# Patient Record
Sex: FEMALE | Race: BLACK OR AFRICAN AMERICAN | NOT HISPANIC OR LATINO | URBAN - METROPOLITAN AREA
[De-identification: names, ages, dates, MRNs, and addresses within clinical notes are randomized per-mention and may not be internally consistent; named-entity substitution may affect disease eponyms.]

---

## 2017-01-22 ENCOUNTER — EMERGENCY (EMERGENCY)
Facility: HOSPITAL | Age: 22
LOS: 1 days | Discharge: HOME | End: 2017-01-22
Attending: EMERGENCY MEDICINE | Admitting: EMERGENCY MEDICINE
Payer: COMMERCIAL

## 2017-01-22 VITALS
TEMPERATURE: 98 F | SYSTOLIC BLOOD PRESSURE: 124 MMHG | DIASTOLIC BLOOD PRESSURE: 67 MMHG | OXYGEN SATURATION: 100 % | HEART RATE: 102 BPM | RESPIRATION RATE: 18 BRPM

## 2017-01-22 VITALS — RESPIRATION RATE: 16 BRPM | HEART RATE: 70 BPM | OXYGEN SATURATION: 100 % | TEMPERATURE: 98 F

## 2017-01-22 DIAGNOSIS — R10.13 EPIGASTRIC PAIN: ICD-10-CM

## 2017-01-22 DIAGNOSIS — Z79.2 LONG TERM (CURRENT) USE OF ANTIBIOTICS: ICD-10-CM

## 2017-01-22 DIAGNOSIS — G89.29 OTHER CHRONIC PAIN: ICD-10-CM

## 2017-01-22 LAB
APPEARANCE UR: CLEAR — SIGNIFICANT CHANGE UP
BILIRUB UR-MCNC: NEGATIVE — SIGNIFICANT CHANGE UP
COLOR SPEC: YELLOW — SIGNIFICANT CHANGE UP
DIFF PNL FLD: (no result)
GLUCOSE UR QL: NEGATIVE — SIGNIFICANT CHANGE UP
KETONES UR-MCNC: NEGATIVE — SIGNIFICANT CHANGE UP
LEUKOCYTE ESTERASE UR-ACNC: NEGATIVE — SIGNIFICANT CHANGE UP
NITRITE UR-MCNC: NEGATIVE — SIGNIFICANT CHANGE UP
PH UR: 5 — SIGNIFICANT CHANGE UP (ref 4–8)
PROT UR-MCNC: NEGATIVE MG/DL — SIGNIFICANT CHANGE UP
SP GR SPEC: >=1.03 — SIGNIFICANT CHANGE UP (ref 1–1.03)
UROBILINOGEN FLD QL: 0.2 E.U./DL — SIGNIFICANT CHANGE UP

## 2017-01-22 PROCEDURE — 99283 EMERGENCY DEPT VISIT LOW MDM: CPT | Mod: 25

## 2017-01-22 PROCEDURE — 81001 URINALYSIS AUTO W/SCOPE: CPT

## 2017-01-22 PROCEDURE — 87086 URINE CULTURE/COLONY COUNT: CPT

## 2017-01-22 PROCEDURE — 81003 URINALYSIS AUTO W/O SCOPE: CPT

## 2017-01-22 PROCEDURE — 99283 EMERGENCY DEPT VISIT LOW MDM: CPT

## 2017-01-22 RX ORDER — FAMOTIDINE 10 MG/ML
20 INJECTION INTRAVENOUS ONCE
Qty: 0 | Refills: 0 | Status: COMPLETED | OUTPATIENT
Start: 2017-01-22 | End: 2017-01-22

## 2017-01-22 RX ORDER — FAMOTIDINE 10 MG/ML
20 INJECTION INTRAVENOUS ONCE
Qty: 0 | Refills: 0 | Status: DISCONTINUED | OUTPATIENT
Start: 2017-01-22 | End: 2017-01-22

## 2017-01-22 RX ORDER — SODIUM CHLORIDE 9 MG/ML
1000 INJECTION INTRAMUSCULAR; INTRAVENOUS; SUBCUTANEOUS ONCE
Qty: 0 | Refills: 0 | Status: COMPLETED | OUTPATIENT
Start: 2017-01-22 | End: 2017-01-22

## 2017-01-22 RX ADMIN — FAMOTIDINE 20 MILLIGRAM(S): 10 INJECTION INTRAVENOUS at 02:51

## 2017-01-22 RX ADMIN — Medication 30 MILLILITER(S): at 02:51

## 2017-01-22 NOTE — ED ADULT NURSE NOTE - [facility]PI ED SYMPTOMS NEG
no burning urination/no dysuria/no abdominal distension/no diarrhea/no chills/no fever/no blood in stool

## 2017-01-22 NOTE — ED PROVIDER NOTE - MEDICAL DECISION MAKING DETAILS
21yoF with acute on chronic epigastric abd pain, no other complaints, nontender on exam, plan was for labs and ua, with IV medications, but pt refusing needlestick for labs and IV, given po meds, feeling better, discussed possible missed GI/liver/pancreatic illness without labs, but pt states she will find a primary care doctor and get outpatient workup.

## 2017-01-22 NOTE — ED ADULT NURSE NOTE - OBJECTIVE STATEMENT
20yo F, A&ox3, came into Er c/o generalized abdominal pain xfew days. Pt c/o some n/v this morning. Denies fever nor chills. No cp, no sob. no numbness, no tingling. Denies urinary s/s. Pt appears well with NAD. Lungs clear bilateral, no jvd, no edema. No recent sick contact nor recent travel. Mild discomfort throughout abdomen upon palpation. Will continue to monitor.

## 2017-01-22 NOTE — ED ADULT NURSE REASSESSMENT NOTE - NS ED NURSE REASSESS COMMENT FT1
PT resuses blood work and iv at the time. MD Winslow made aware. Labs delayed will continue to monitor.

## 2017-01-22 NOTE — ED ADULT TRIAGE NOTE - CHIEF COMPLAINT QUOTE
I have been having abdominal pain and headaches for a long time.  I have ignored them.  Today I started vomiting and I felt like I had ignored it long enough that I should come to the doctor.

## 2017-01-22 NOTE — ED PROVIDER NOTE - OBJECTIVE STATEMENT
21yoF here with epigastric abdominal pain, reports ongoing "for a long time", but worse over the past few days and so she came into the ED for evaluation today. No n/v/d/constipation, no cp/sob, no cough/uri sx, no urinary sx.

## 2017-01-23 LAB
CULTURE RESULTS: NO GROWTH — SIGNIFICANT CHANGE UP
SPECIMEN SOURCE: SIGNIFICANT CHANGE UP

## 2018-12-08 NOTE — ED PROVIDER NOTE - GASTROINTESTINAL [-], MLM
Nursing Note by Carlie Ramírez Phelps Memorial HospitalIN ProMedica Charles and Virginia Hickman Hospital [place]  [facility] with [facility] was on 03/10/2017 at  1:50 PM in [facility] SEQ  Match done based on reference date of today 17  Niru Kaur was offered treatment for pain control:[PT1.1T] No.[PT1.1M]         Revision History        User Key Date/Time User Provider Type Action    > PT1.1 17 10:35 AM Carlie Ramírez, RN Registered Nurse Sign    M - Manual, T - Template            
no vomiting/no nausea/no diarrhea

## 2024-04-29 NOTE — ED ADULT TRIAGE NOTE - MEANS OF ARRIVAL
Tobacco abuse-Smokes 1/2 PPD. She is on wellbutrin.     Chelsea Abdalla  reports that she has been smoking cigarettes. She started smoking about 14 years ago. She has a 12 pack-year smoking history. She has never been exposed to tobacco smoke. She has never used smokeless tobacco. I have educated her on the risk of diseases from using tobacco products such as cancer, COPD, and heart disease.     I advised her to quit and she is willing to quit. We have discussed the following method/s for tobacco cessation:  Prescription Medicaiton.  Together we have set a quit date for 3 weeks from today.  She will follow up with me in 3 week or sooner to check on her progress.    I spent 3  minutes counseling the patient.         ambulatory